# Patient Record
(demographics unavailable — no encounter records)

---

## 2024-12-05 NOTE — DISCUSSION/SUMMARY
[FreeTextEntry1] : Patient has medical history detailed above and active medical issues including:  - Abnormal EKG, sinus rhythm RBBB history unchanged  - Hyperglyceridemia, continue fish oil, modify diet, lowers coronary calcium score, repeat labs ordered   - Family history of premature CAD  Advised patient to follow active lifestyle with regular cardiovascular exercise. Patient educated on heart healthy diet. Recommend increased oral hydration with electrolyte supplement drinks, avoid excess alcohol and caffeine.  Patient is aware to call with any symptoms or concerns.   Cardiology follow-up after noninvasive testing with echocardiogram and Doppler studies  Jamar will follow-up with Dr. David Baig for primary care.  Total time spent 45 minutes, reviewing of test results, chart information, patient discussion, physical exam and completion of chart documentation.

## 2024-12-05 NOTE — REASON FOR VISIT
[Other: ____] : [unfilled] [FreeTextEntry1] : Jamar has a past medical history of hyperglyceridemia, RBBB, family history of premature CAD.  No history of CAD, MI, revascularization, VHD, CHF, TIA, CVA, diabetes, PVD, DVT, PE, arrhythmia, AF.  Cardiovascular review of symptoms is negative for exertional chest pain, dyspnea, palpitations, dizziness or syncope.  No PND or orthopnea leg edema.  No bleeding or black stool.  Patient is exercising 30 minutes without exertional symptoms.  Patient is weightlifting in the gym reminded him to lift less than 80 pounds.   EKG sinus rhythm, RBBB, LAD.  Labs Sept 2024, normal BMP, CBC, LFT, TSH, HbA1c 5.5, triglyceride 396, HDL 27, , total cholesterol 196.

## 2025-05-12 NOTE — PHYSICAL EXAM
[Alert] : alert [Oriented x 3] : ~L oriented x 3 [Well Nourished] : well nourished [FreeTextEntry3] : The following areas were examined and no significant abnormalities were seen except as noted below:  Type II skin  scalp, face, eyelids, nose, lips, ears, neck, chest, abdomen, back,groin, buttocks, right arm, left arm, right hand, left hand, right  leg, left leg, right foot, left foot  The following areas were examined and no significant abnormalities were seen except as noted below:  Type II skin  scalp, face, eyelids, nose, lips, ears, neck, chest, abdomen, back,groin, buttocks, right arm, left arm, right hand, left hand, right  leg, left leg, right foot, left foot  Anterior scalp: Mild small pink scaly macules Right proximal forearm: 9 x 6 mm splotchy brown patch (lesion measured 10 x 4 mm on the visit of November 21, 2024)-not suspicious on dermoscopy Right anterior shoulder: 6 x 4 mm pink and brown focally scaly plaque-not suspicious on dermoscopy Chest and arms: Rare tan verrucous plaques  No other suspicious lesions seen

## 2025-05-12 NOTE — PLAN
[TextEntry] : Will continue to observe the lesion on right proximal forearm-photo taken Will observe the actinic keratoses on the scalp-patient advised to wear hat when outside Treat lesion of right anterior shoulder with cryosurgery  Verbal consent obtained.  Potential risks including oozing, blister formation, post-inflammatory hypo/hyperpigmentation discussed.  Cryosurgery using liquid nitrogen spray applied for 7 seconds X 2 given to 1 lesions on the right anterior shoulder  Patient told the wound(s) may develop oozing, crusting or blisters  Return in 6 months-patient moving to [place]

## 2025-05-12 NOTE — HISTORY OF PRESENT ILLNESS
[FreeTextEntry1] : Evaluation of growths [de-identified] : Follow-up visit for 58-year-old white male last seen by me on November 21, 2024, for evaluation of growths. Particularly concerned about a lesion on the right shoulder.  No history of skin cancer.  Patient has a history of seborrheic dermatitis on the scalp and arms.  Treated with: Start triamcinolone cream 0.1% to scalp once or twice a day as needed

## 2025-05-12 NOTE — ASSESSMENT
[FreeTextEntry1] : Actinic keratoses on scalp Probable actinic keratosis on right anterior shoulder Probable lentigo on right proximal forearm